# Patient Record
Sex: MALE | Race: WHITE
[De-identification: names, ages, dates, MRNs, and addresses within clinical notes are randomized per-mention and may not be internally consistent; named-entity substitution may affect disease eponyms.]

---

## 2018-07-17 NOTE — PAT MEDICATION INSTRUCTIONS
Service Date


Jul 17, 2018.





Current Home Medication List


Acetaminophen Tab (Tylenol), 650 MG PO PRN


Alfuzosin Hcl (Uroxatral), 10 MG PO QAM


Ascorbic Acid (Vitamin C), 1,000 MG PO QPM


Diltiazem Hcl Ext Rel (Tiazac), 240 MG PO QAM


Fish Oil (Omega-3), 2,000 MG PO BID


Furosemide (Lasix), 20 MG PO QAM


Glucosamine-Chondroitin-Vit C- (Glucosamine Chondroitin), 1 TAB PO QAM


Metoprolol Tartrate (Lopressor) (Lopressor), 50 MG PO BID


Misc Natural Products (Turmeric Curcumin)


Pantoprazole (Protonix), 40 MG PO BID


Rivaroxaban (Xarelto), 20 MG PO QPM


Turmeric (Curcuma Longa) (Turmeric Curcumin), 1,000 MG PO QAM


[Janumet/Metformin], 1 TAB PO QAM





Medication Instructions


For Your Scheduled Surgery 








- Hold the following medications starting 7/18/18:


Fish Oil (Omega-3), 2,000 MG PO BID


Glucosamine-Chondroitin-Vit C- (Glucosamine Chondroitin), 1 TAB PO QAM


Misc Natural Products (Turmeric Curcumin)


Turmeric (Curcuma Longa) (Turmeric Curcumin), 1,000 MG PO QAM








- Check with surgeon and cardiologist for instructions: 


Rivaroxaban (Xarelto), 20 MG PO QPM








- Hold the following medications the morning of surgery:


Furosemide (Lasix), 20 MG PO QAM


[Janumet/Metformin], 1 TAB PO QAM








- Take the following medications the morning of surgery with a sip of water:


Acetaminophen Tab (Tylenol), 650 MG PO PRN (okay to take up to 4 hours prior to 

surgery if needed)


Alfuzosin Hcl (Uroxatral), 10 MG PO QAM


Diltiazem Hcl Ext Rel (Tiazac), 240 MG PO QAM


Metoprolol Tartrate (Lopressor) (Lopressor), 50 MG PO BID


Pantoprazole (Protonix), 40 MG PO BID








- Take the following medications as scheduled the night before surgery:


Acetaminophen Tab (Tylenol), 650 MG PO PRN (if needed)


Ascorbic Acid (Vitamin C), 1,000 MG PO QPM


Metoprolol Tartrate (Lopressor) (Lopressor), 50 MG PO BID


Pantoprazole (Protonix), 40 MG PO BID








If you have any questions please call us at [phone] or [phone] or 

[phone]

## 2018-07-30 ENCOUNTER — HOSPITAL ENCOUNTER (INPATIENT)
Dept: HOSPITAL 45 - C.ACU | Age: 68
LOS: 3 days | Discharge: HOME | DRG: 455 | End: 2018-08-02
Attending: ORTHOPAEDIC SURGERY | Admitting: ORTHOPAEDIC SURGERY
Payer: COMMERCIAL

## 2018-07-30 VITALS
OXYGEN SATURATION: 95 % | HEART RATE: 83 BPM | SYSTOLIC BLOOD PRESSURE: 105 MMHG | DIASTOLIC BLOOD PRESSURE: 65 MMHG | TEMPERATURE: 97.7 F

## 2018-07-30 VITALS
TEMPERATURE: 97.7 F | HEART RATE: 69 BPM | SYSTOLIC BLOOD PRESSURE: 136 MMHG | DIASTOLIC BLOOD PRESSURE: 77 MMHG | OXYGEN SATURATION: 95 %

## 2018-07-30 VITALS
HEART RATE: 80 BPM | TEMPERATURE: 97.52 F | OXYGEN SATURATION: 93 % | DIASTOLIC BLOOD PRESSURE: 63 MMHG | SYSTOLIC BLOOD PRESSURE: 101 MMHG

## 2018-07-30 VITALS
BODY MASS INDEX: 31.56 KG/M2 | HEIGHT: 70 IN | HEIGHT: 70 IN | WEIGHT: 220.46 LBS | WEIGHT: 220.46 LBS | BODY MASS INDEX: 31.56 KG/M2 | BODY MASS INDEX: 31.56 KG/M2

## 2018-07-30 VITALS
DIASTOLIC BLOOD PRESSURE: 65 MMHG | OXYGEN SATURATION: 95 % | TEMPERATURE: 97.7 F | SYSTOLIC BLOOD PRESSURE: 103 MMHG | HEART RATE: 84 BPM

## 2018-07-30 VITALS — DIASTOLIC BLOOD PRESSURE: 62 MMHG | HEART RATE: 92 BPM | SYSTOLIC BLOOD PRESSURE: 96 MMHG

## 2018-07-30 VITALS
SYSTOLIC BLOOD PRESSURE: 142 MMHG | HEART RATE: 77 BPM | OXYGEN SATURATION: 96 % | DIASTOLIC BLOOD PRESSURE: 96 MMHG | TEMPERATURE: 98.6 F

## 2018-07-30 VITALS
HEART RATE: 82 BPM | DIASTOLIC BLOOD PRESSURE: 78 MMHG | SYSTOLIC BLOOD PRESSURE: 110 MMHG | TEMPERATURE: 97.88 F | OXYGEN SATURATION: 95 %

## 2018-07-30 VITALS
DIASTOLIC BLOOD PRESSURE: 68 MMHG | OXYGEN SATURATION: 92 % | TEMPERATURE: 97.7 F | HEART RATE: 75 BPM | SYSTOLIC BLOOD PRESSURE: 108 MMHG

## 2018-07-30 DIAGNOSIS — I48.91: ICD-10-CM

## 2018-07-30 DIAGNOSIS — M48.062: Primary | ICD-10-CM

## 2018-07-30 DIAGNOSIS — Z88.7: ICD-10-CM

## 2018-07-30 DIAGNOSIS — Z87.891: ICD-10-CM

## 2018-07-30 DIAGNOSIS — E11.9: ICD-10-CM

## 2018-07-30 DIAGNOSIS — R33.8: ICD-10-CM

## 2018-07-30 DIAGNOSIS — N40.0: ICD-10-CM

## 2018-07-30 DIAGNOSIS — Z88.8: ICD-10-CM

## 2018-07-30 DIAGNOSIS — Z79.899: ICD-10-CM

## 2018-07-30 DIAGNOSIS — Z79.84: ICD-10-CM

## 2018-07-30 DIAGNOSIS — N18.3: ICD-10-CM

## 2018-07-30 DIAGNOSIS — I13.10: ICD-10-CM

## 2018-07-30 DIAGNOSIS — Z79.01: ICD-10-CM

## 2018-07-30 LAB
HCT VFR BLD CALC: 33.9 % (ref 42–52)
HGB BLD-MCNC: 11.6 G/DL (ref 14–18)

## 2018-07-30 PROCEDURE — 0SG00AJ FUSION OF LUMBAR VERTEBRAL JOINT WITH INTERBODY FUSION DEVICE, POSTERIOR APPROACH, ANTERIOR COLUMN, OPEN APPROACH: ICD-10-PCS | Performed by: ORTHOPAEDIC SURGERY

## 2018-07-30 PROCEDURE — 0SG3071 FUSION OF LUMBOSACRAL JOINT WITH AUTOLOGOUS TISSUE SUBSTITUTE, POSTERIOR APPROACH, POSTERIOR COLUMN, OPEN APPROACH: ICD-10-PCS | Performed by: ORTHOPAEDIC SURGERY

## 2018-07-30 PROCEDURE — 0ST20ZZ RESECTION OF LUMBAR VERTEBRAL DISC, OPEN APPROACH: ICD-10-PCS | Performed by: ORTHOPAEDIC SURGERY

## 2018-07-30 PROCEDURE — 0SG1071 FUSION OF 2 OR MORE LUMBAR VERTEBRAL JOINTS WITH AUTOLOGOUS TISSUE SUBSTITUTE, POSTERIOR APPROACH, POSTERIOR COLUMN, OPEN APPROACH: ICD-10-PCS | Performed by: ORTHOPAEDIC SURGERY

## 2018-07-30 RX ADMIN — CEFAZOLIN SCH MLS/MIN: 10 INJECTION, POWDER, FOR SOLUTION INTRAVENOUS at 19:31

## 2018-07-30 RX ADMIN — SODIUM CHLORIDE SCH MLS/HR: 900 INJECTION, SOLUTION INTRAVENOUS at 23:53

## 2018-07-30 RX ADMIN — SODIUM CHLORIDE SCH MLS/HR: 900 INJECTION, SOLUTION INTRAVENOUS at 17:25

## 2018-07-30 RX ADMIN — FENTANYL CITRATE PRN MCG: 50 INJECTION, SOLUTION INTRAMUSCULAR; INTRAVENOUS at 16:21

## 2018-07-30 RX ADMIN — METOPROLOL TARTRATE SCH MG: 50 TABLET, FILM COATED ORAL at 21:00

## 2018-07-30 RX ADMIN — PANTOPRAZOLE SCH MG: 40 TABLET, DELAYED RELEASE ORAL at 21:03

## 2018-07-30 RX ADMIN — OXYCODONE HYDROCHLORIDE PRN MG: 5 TABLET ORAL at 22:08

## 2018-07-30 RX ADMIN — INSULIN ASPART SCH UNITS: 100 INJECTION, SOLUTION INTRAVENOUS; SUBCUTANEOUS at 21:06

## 2018-07-30 RX ADMIN — FENTANYL CITRATE PRN MCG: 50 INJECTION, SOLUTION INTRAMUSCULAR; INTRAVENOUS at 16:27

## 2018-07-30 RX ADMIN — STANDARDIZED SENNA CONCENTRATE AND DOCUSATE SODIUM SCH TAB: 8.6; 5 TABLET ORAL at 21:04

## 2018-07-30 RX ADMIN — OXYCODONE HYDROCHLORIDE PRN MG: 5 TABLET ORAL at 17:29

## 2018-07-30 NOTE — HISTORY AND PHYSICAL
History & Physical


Date


Jul 30, 2018.





Chief Complaint


Back and leg pain





History of Present Illness


The patient is a 68 year old male with complaints of back and leg pain





Additional History


Hepatic Disease:  No


Endocrine Disorder:  No


Kidney Disease:  No


Hypertension:  Yes


Heart Disease:  Yes


Bleeding Tendencies:  No


Infectious Diseases:  No


Other:


Atrial fibrillation


Diabetes





Allergies


Coded Allergies:  


     Dextromethorphan (Verified  Allergy, Unknown, RASH, 7/30/18)


     Doxylamine (Verified  Allergy, Unknown, RASH, 7/30/18)


     Ethanol (Verified  Allergy, Unknown, RASH, 7/30/18)


     Pseudoephedrine (Verified  Allergy, Unknown, RASH, 7/30/18)


     Tetanus Toxoid (Verified  Allergy, Unknown, RASH SWELLING, 7/30/18)





Home Medications


Scheduled


Acetaminophen Tab (Tylenol), 650 MG PO PRN


Alfuzosin Hcl (Uroxatral), 10 MG PO QAM


Ascorbic Acid (Vitamin C), 1,000 MG PO QPM


Diltiazem Hcl Ext Rel (Tiazac), 240 MG PO QAM


Fish Oil (Omega-3), 2,000 MG PO BID


Furosemide (Lasix), 20 MG PO QAM


Glucosamine-Chondroitin-Vit C- (Glucosamine Chondroitin), 1 TAB PO QAM


Metoprolol Tartrate (Lopressor) (Lopressor), 50 MG PO BID


Pantoprazole (Protonix), 40 MG PO BID


Rivaroxaban (Xarelto), 20 MG PO QPM


Turmeric (Curcuma Longa) (Turmeric Curcumin), 1,000 MG PO QAM


Valsartan/Hctz (Diovan Hct 160MG/25MG), 1 TAB PO DAILY


[Janumet/Metformin], 1 TAB PO QAM





Miscellaneous Medications


Misc Natural Products (Turmeric Curcumin)





Physical Examination


Skin:  warm/dry, no rash


Eyes:  normal inspection, EOMI, sclerae normal


ENT:  normal ENT inspection, pharynx normal


Head:  normocephalic, atraumatic


Neck:  supple, no adenopathy, trachea midline


Respiratory/Chest:  lungs clear, normal breath sounds, no respiratory distress


Cardiovascular:  regular rate, rhythm, no edema, no murmur


Abdomen / GI:  normal bowel sounds, non tender


Back:  normal inspection


Extremities:  normal inspection, normal range of motion


Neurologic/Psych:  no motor/sensory deficits, alert, normal reflexes, oriented 

x 3





Diagnosis


Lumbar spinal stenosis with neurogenic claudication





Plan of Treatment


L2-S1 decompression and fusion

## 2018-07-30 NOTE — MEDICAL CONSULT
Consultation


Date of Consultation:


Jul 30, 2018.


Attending Physician:


Nico Smith D.O.


Reason for Consultation:


medical management post spine surgery


History of Present Illness


here post spine surgery


very groggy


did have some pain meds earlier


no acute complaints - no pain at this time. 





wife present and does not voice any concerns either





med hx confirmed - most relevant being DM, afib, HTN





Past Medical/Surgical History


DM2 (last A1c 7 point something)


HTN


afib (new onset last year, only hospitalized for initial case, did have 

ablation last fall, still gets palpitations at times)





Family History


none of note





Social History


Smoking Status:  Former Smoker





Allergies


Coded Allergies:  


     Dextromethorphan (Verified  Allergy, Unknown, RASH, 7/30/18)


     Doxylamine (Verified  Allergy, Unknown, RASH, 7/30/18)


     Ethanol (Verified  Allergy, Unknown, RASH, 7/30/18)


     Pseudoephedrine (Verified  Allergy, Unknown, RASH, 7/30/18)


     Tetanus Toxoid (Verified  Allergy, Unknown, RASH SWELLING, 7/30/18)





Current Inpatient Medications





Current Inpatient Medications








 Medications


  (Trade)  Dose


 Ordered  Sig/Romelia


 Route  Start Time


 Stop Time Status Last Admin


Dose Admin


 


 Promethazine HCl


 12.5 mg/Sodium


 Chloride  50.5 ml @ 


 202 mls/hr  Q6H  PRN


 IV  7/30/18 15:45


 8/29/18 15:44   


 


 


 Ondansetron HCl


  (Zofran Inj)  4 mg  Q6H  PRN


 IV  7/30/18 15:45


 8/29/18 15:44   


 


 


 Metoclopramide HCl


  (Reglan Inj)  10 mg  Q6H  PRN


 IV  7/30/18 15:45


 8/29/18 15:44   


 


 


 Lorazepam


  (Ativan Tab)  0.5 mg  Q8H  PRN


 PO  7/30/18 15:45


 8/29/18 15:44   


 


 


 Lorazepam 0.5 mg/


 Syringe  1 ml @ 1


 mls/min  Q8H  PRN


 IV  7/30/18 15:45


 8/29/18 15:44   


 


 


 Pneumococcal


 Polysaccharide


 Vaccine  1 ea  PRN  PRN


 N/A  7/30/18 15:45


 8/29/18 15:44   


 


 


 Influenza Virus


 Vacc Triv Types


 A&B  1 ea  PRN  PRN


 N/A  7/30/18 15:45


 8/29/18 15:44   


 


 


 Polyethylene


  (Miralax Powder


 Packet)  17 gm  Q6


 PO  8/1/18 06:00


 8/31/18 05:59   


 


 


 Bisacodyl


  (Dulcolax Supp)  10 mg  DAILY  PRN


 IN  7/30/18 15:45


 8/29/18 15:44   


 


 


 Magnesium


 Hydroxide


  (Milk Of


 Magnesia Susp)  30 ml  DAILY  PRN


 PO  7/30/18 15:45


 8/29/18 15:44   


 


 


 Hydromorphone HCl


  (Dilaudid Inj)  0.5-1mg


 prn


 moder...  Q3H  PRN


 IV  7/30/18 17:00


 8/13/18 16:59   


 


 


 Oxycodone HCl


  (Roxicodone


 Immediate Rel Tab)  5-10mg prn


 moderate


 to sev...  Q4H  PRN


 PO  7/30/18 17:00


 8/13/18 16:59  7/30/18 17:29


5 MG


 


 Cefazolin Sodium


 2000 mg/Syringe  15 ml @ 


 3.75 mls/


 min  Q8H


 IV  7/30/18 20:00


 7/31/18 04:03   


 


 


 Sodium Chloride  1,000 ml @ 


 150 mls/hr  Q6H40M


 IV  7/30/18 15:42


 8/29/18 15:41  7/30/18 17:25


150 MLS/HR


 


 Acetaminophen


  (Tylenol Tab)  1,000 mg  Q8H  PRN


 PO  7/30/18 15:45


 8/29/18 15:44   


 


 


 Acetaminophen  100 ml @ 


 400 mls/hr  Q8H  PRN


 IV  7/30/18 15:45


 8/29/18 15:44   


 


 


 Naloxone HCl


  (Narcan Inj)  0.1 mg  Q5M  PRN


 IV  7/30/18 15:45


 8/29/18 15:44   


 


 


 Senna/Docusate


 Sodium


  (Senokot S Tab)  2 tab  HS


 PO  7/30/18 21:00


 8/29/18 20:59   


 


 


 Sodium


 Biphosphate/


 Sodium Phosphate


  (Fleet Enema)  132 ml  ONE  PRN


 IN  7/30/18 15:45


 8/29/18 15:44   


 


 


 Hydroxyzine HCl


  (Vistaril Tab)  25 mg  Q8H  PRN


 PO  7/30/18 15:45


 8/29/18 15:44   


 


 


 Al Hydroxide/Mg


 Hydroxide


  (Maalox Susp)  30 ml  Q6H  PRN


 PO  7/30/18 15:45


 8/29/18 15:44   


 


 


 Famotidine


  (Pepcid Tab)  20 mg  Q12  PRN


 PO  7/30/18 15:45


 8/29/18 15:44   


 


 


 Diphenhydramine


 HCl


  (Benadryl Cap)  25 mg  Q6H  PRN


 PO  7/30/18 15:45


 8/29/18 15:44   


 


 


 Alfuzosin HCl


  (Uroxatral Tab)  10 mg  QAM


 PO  7/31/18 09:00


 8/30/18 08:59   


 


 


 Diltiazem HCl


  (TIAzac CAP)  240 mg  QAM


 PO  7/31/18 09:00


 8/30/18 08:59   


 


 


 Furosemide


  (Lasix Tab)  20 mg  QAM


 PO  7/31/18 09:00


 8/30/18 08:59   


 


 


 Metoprolol


 Tartrate


  (Lopressor Tab)  50 mg  BID


 PO  7/30/18 21:00


 8/29/18 20:59   


 


 


 Pantoprazole


 Sodium


  (Protonix Tab)  40 mg  BID


 PO  7/30/18 21:00


 8/29/18 20:59   


 


 


 Valsartan


  (Diovan Tab)  160 mg  DAILY


 PO  7/31/18 09:00


 8/30/18 08:59   


 


 


 Hydrochlorothiazide


  (Hydrochlorothiazide


 Tab)  25 mg  DAILY


 PO  7/31/18 09:00


 8/30/18 08:59   


 











Review of Systems


ROS otherwise unobtainable except for as above, does not voice complaints, but 

is quite groggy post op and post pain meds





Physical Exam











  Date Time  Temp Pulse Resp B/P (MAP) Pulse Ox O2 Delivery O2 Flow Rate FiO2


 


7/30/18 18:04 36.4 80 18 101/63 (76) 93 Nasal Cannula 2.0 


 


7/30/18 17:32 36.5 75 16 108/68 (81) 92 Nasal Cannula 2.0 


 


7/30/18 17:09      Nasal Cannula 2.0 


 


7/30/18 17:00 36.6 82 16 110/78 (89) 95 Nasal Cannula 2.0 


 


7/30/18 16:45 36.5 84 19 115/66 94 Nasal Cannula 4 


 


7/30/18 16:35  84 25 114/66 93 Nasal Cannula 4 


 


7/30/18 16:25  89 27 113/76 95 Oxymask 10 


 


7/30/18 16:15  89 27 113/76 95 Oxymask 10 


 


7/30/18 16:07 36.2 89 17 117/70 96 Oxymask 10 


 


7/30/18 10:43 37 77 20 142/96 96 Room Air  








General Appearance:  WD/WN, no apparent distress (fatigued)


Head:  atraumatic


Eyes:  EOMI


ENT:  hearing grossly normal


Neck:  trachea midline


Respiratory/Chest:  lungs clear, normal breath sounds, no respiratory distress, 

no accessory muscle use


Cardiovascular:  regular rate, rhythm, no edema, no gallop, no murmur


Extremities/Musculoskelatal:  + pertinent finding (no gross lesions or 

deformities)


Neurologic/Psych:  CNs II-XII nml as tested, alert, normal mood/affect


Skin:  normal color, warm/dry





Laboratory Results





Last 24 Hours








Test


  7/30/18


10:23 7/30/18


10:33 7/30/18


16:11 7/30/18


16:18


 


Bedside Glucose 136 mg/dl   215 mg/dl  


 


Hemoglobin    11.6 g/dL 


 


Hematocrit    33.9 % 











Assessment & Plan


atrial fibrillation


-rates controlled - continue diltiazem


-resume anticoagulation once safe with ortho/spine, ok to hold for now








essential hypertension


-continue diltiazem, follow BP


-until clear hemodynamics are stable will hold diuretics and ARB


-since valsartan implicated as carcinogen by chinese manufacturers, may want to 

consider changing ARB altogether


-?utility of both HCTZ and lasix - doesn't relate a CHF hx, possibly when 

resuming just resume HCTZ and follow








type 2 diabetes


-hold janumet, use fingersticks and supplemental insulin - transition to basal/

bolus if needed


-check A1c


-follow








elevated creatinine


-?baseline stage 2 CKD vs dry from being on both thiazide and loop - see above.

  follow BMP





DVT proph


-per orthospine; as above would resume anticoagulation once safe

## 2018-07-30 NOTE — DIAGNOSTIC IMAGING REPORT
LUMBAR SPINE, INTRAOPERATIVE FLUOROSCOPY



HISTORY: L2-S1 decompression and fusion.



FLUOROSCOPY TIME: 35 seconds.



FINDINGS: Intraoperative fluoroscopy was provided for the lumbar spine. 4

fluoroscopic spot images were obtained. Posterior decompression fusion from L2

through S1 with pedicle screws and rods. The hardware is intact.

IMPRESSION: Fluoroscopy provided for a L2-S1 posterior decompression and fusion.









Electronically signed by:  Abdullahi Manuel M.D.

7/30/2018 4:02 PM



Dictated Date/Time:  7/30/2018 4:01 PM

## 2018-07-30 NOTE — [facility] OPERATIVE REPORT
Operative Report


Operative Date


Jul 30, 2018.





Pre-Operative Diagnosis





Lumbar spinal stenosis with neurogenic claudication





Post-Operative Diagnosis





Lumbar spinal stenosis with neurogenic claudication





Procedure(s) Performed





1.  Lumbar decompression medial facetectomy foraminotomy L2-3 L3-4 L4-5


L5-S1.  #2 posterior spinal fusion L2-3 L3-4 L4-5 L5-S1.  #3 placement


posterior segmental instrumentation L2-3 L3-4 L4-5 L5-S1.  #4 interbody


fusion L4-5.  #5 placement of 9 x 26 mm titanium cage L4-5 per #6


placement of local autograft in the posterior lateral gutters.  #7


placement InFUSE collagen sponge, with master graft in the posterior


gutters and ostial amp in the interbody space.





Surgeon


Dr. Smith





Assistant Surgeon(s)


Brionna Alvarado PA-C





Estimated Blood Loss


800





Findings


Severe multilevel spinal stenosis





Specimens





none per surgeon





Description of Procedure


Patient was met with preoperatively case discussed all questions addressed.  

After informed consent obtained patient was taken to the operative suite 

underwent intubation placed prone position on the Willi table on top of the 

Job frame.  All bony prominences well-padded eyes inspected to ensure no 

external pressure placed upon the.  This point the lumbar spine was prepped and 

draped in a normal sterile fashion.  Sharp dissection with the assistance Bovie 

cautery was performed down to and exposing the lamina and transverse processes 

of L2 L3-L4-L5 and sacral ala bilaterally.  From a caudal to cephalad fashion 

complete laminectomy of L5 L4 L3 and L2 is performed addressing severe lateral 

recess and foraminal disease.  Pedicle screws were then placed in L2 L3-L4-L5 

and S1 levels bilaterally with the assistance of fluoroscopy and the purposes 

ekta provisionally placed.  Through a trans-foraminal approach on the left 

complete discectomy of L4-5 was performed endplates created to subcortical 

bleeding bone and a 9 x 26 mm titanium cage filled with ostium bone graft 

tapped in position.  Probe size rods were then locked in position bilaterally.  

The transverse processes of L2 L3-L4-L5 and sacral ala burred to subcortical 

bleeding bone.  Infuse collagen sponge mass graft local autograft placed in the 

posterior gutters.  Approximately 150 cc of Exparel injected into the 

musculature.  15 round JULY drain inserted.  Incision was then closed with 1 

Vicryl fascia 2-0 Vicryl subtends a 4 Monocryl for final skin closure.  Sterile 

dressings placed.  Patient will continue to PACU stable disc.  Please note Brionna Morton was present throughout the entire procedure involved in patient 

positioning complex portions of the surgery and final skin closure.


I attest to the content of the Intraoperative Record and any orders documented 

therein.  Any exceptions are noted below.

## 2018-07-30 NOTE — ANESTHESIOLOGY PROGRESS NOTE
Anesthesia Post Op Note


Date & Time


Jul 30, 2018 at 16:45





Vital Signs


Pain Intensity:  3





Vital Signs Past 12 Hours








  Date Time  Temp Pulse Resp B/P (MAP) Pulse Ox O2 Delivery O2 Flow Rate FiO2


 


7/30/18 16:35  84 25 114/66 93 Nasal Cannula 4 


 


7/30/18 16:25  89 27 113/76 95 Oxymask 10 


 


7/30/18 16:15  89 27 113/76 95 Oxymask 10 


 


7/30/18 16:07 36.2 89 17 117/70 96 Oxymask 10 


 


7/30/18 10:43 37 77 20 142/96 96 Room Air  











Notes


Mental Status:  alert / awake / arousable, participated in evaluation


Pt Amnestic to Procedure:  Yes


Nausea / Vomiting:  adequately controlled


Pain:  adequately controlled


Airway Patency, RR, SpO2:  stable & adequate


BP & HR:  stable & adequate


Hydration State:  stable & adequate


Anesthetic Complications:  no major complications apparent

## 2018-07-31 VITALS
SYSTOLIC BLOOD PRESSURE: 114 MMHG | HEART RATE: 98 BPM | OXYGEN SATURATION: 95 % | DIASTOLIC BLOOD PRESSURE: 72 MMHG | TEMPERATURE: 97.88 F

## 2018-07-31 VITALS
OXYGEN SATURATION: 94 % | DIASTOLIC BLOOD PRESSURE: 72 MMHG | TEMPERATURE: 97.88 F | SYSTOLIC BLOOD PRESSURE: 118 MMHG | HEART RATE: 100 BPM

## 2018-07-31 VITALS
DIASTOLIC BLOOD PRESSURE: 75 MMHG | TEMPERATURE: 98.06 F | HEART RATE: 88 BPM | OXYGEN SATURATION: 95 % | SYSTOLIC BLOOD PRESSURE: 116 MMHG

## 2018-07-31 VITALS
TEMPERATURE: 97.88 F | DIASTOLIC BLOOD PRESSURE: 75 MMHG | SYSTOLIC BLOOD PRESSURE: 123 MMHG | OXYGEN SATURATION: 93 % | HEART RATE: 84 BPM

## 2018-07-31 VITALS — HEART RATE: 60 BPM | DIASTOLIC BLOOD PRESSURE: 77 MMHG | SYSTOLIC BLOOD PRESSURE: 118 MMHG

## 2018-07-31 VITALS
TEMPERATURE: 97.52 F | DIASTOLIC BLOOD PRESSURE: 61 MMHG | HEART RATE: 90 BPM | SYSTOLIC BLOOD PRESSURE: 98 MMHG | OXYGEN SATURATION: 95 %

## 2018-07-31 VITALS — SYSTOLIC BLOOD PRESSURE: 112 MMHG | HEART RATE: 111 BPM | OXYGEN SATURATION: 95 % | DIASTOLIC BLOOD PRESSURE: 70 MMHG

## 2018-07-31 VITALS — OXYGEN SATURATION: 91 %

## 2018-07-31 LAB
BASOPHILS # BLD: 0 K/UL (ref 0–0.2)
BASOPHILS NFR BLD: 0 %
BUN SERPL-MCNC: 20 MG/DL (ref 7–18)
CALCIUM SERPL-MCNC: 7.3 MG/DL (ref 8.5–10.1)
CO2 SERPL-SCNC: 22 MMOL/L (ref 21–32)
CREAT SERPL-MCNC: 1.32 MG/DL (ref 0.6–1.4)
EOS ABS #: 0 K/UL (ref 0–0.5)
EOSINOPHIL NFR BLD AUTO: 144 K/UL (ref 130–400)
GLUCOSE SERPL-MCNC: 178 MG/DL (ref 70–99)
HBA1C MFR BLD: 6.8 % (ref 4.5–5.6)
HCT VFR BLD CALC: 28.8 % (ref 42–52)
HGB BLD-MCNC: 9.8 G/DL (ref 14–18)
IG#: 0.06 K/UL (ref 0–0.02)
IMM GRANULOCYTES NFR BLD AUTO: 6.1 %
LYMPHOCYTES # BLD: 0.64 K/UL (ref 1.2–3.4)
MCH RBC QN AUTO: 32.8 PG (ref 25–34)
MCHC RBC AUTO-ENTMCNC: 34 G/DL (ref 32–36)
MCV RBC AUTO: 96.3 FL (ref 80–100)
MONO ABS #: 0.47 K/UL (ref 0.11–0.59)
MONOCYTES NFR BLD: 4.4 %
NEUT ABS #: 9.4 K/UL (ref 1.4–6.5)
NEUTROPHILS # BLD AUTO: 0 %
NEUTROPHILS NFR BLD AUTO: 88.9 %
PMV BLD AUTO: 10.2 FL (ref 7.4–10.4)
POTASSIUM SERPL-SCNC: 4.2 MMOL/L (ref 3.5–5.1)
RED CELL DISTRIBUTION WIDTH CV: 14 % (ref 11.5–14.5)
RED CELL DISTRIBUTION WIDTH SD: 49.3 FL (ref 36.4–46.3)
SODIUM SERPL-SCNC: 135 MMOL/L (ref 136–145)
WBC # BLD AUTO: 10.57 K/UL (ref 4.8–10.8)

## 2018-07-31 RX ADMIN — METOPROLOL TARTRATE SCH MG: 50 TABLET, FILM COATED ORAL at 08:43

## 2018-07-31 RX ADMIN — INSULIN ASPART SCH UNITS: 100 INJECTION, SOLUTION INTRAVENOUS; SUBCUTANEOUS at 08:46

## 2018-07-31 RX ADMIN — DILTIAZEM HYDROCHLORIDE SCH MG: 120 CAPSULE, EXTENDED RELEASE ORAL at 08:43

## 2018-07-31 RX ADMIN — INSULIN ASPART SCH UNITS: 100 INJECTION, SOLUTION INTRAVENOUS; SUBCUTANEOUS at 12:21

## 2018-07-31 RX ADMIN — PANTOPRAZOLE SCH MG: 40 TABLET, DELAYED RELEASE ORAL at 21:38

## 2018-07-31 RX ADMIN — PANTOPRAZOLE SCH MG: 40 TABLET, DELAYED RELEASE ORAL at 08:42

## 2018-07-31 RX ADMIN — ACETAMINOPHEN PRN MG: 500 TABLET, FILM COATED ORAL at 00:08

## 2018-07-31 RX ADMIN — OXYCODONE HYDROCHLORIDE PRN MG: 5 TABLET ORAL at 13:58

## 2018-07-31 RX ADMIN — OXYCODONE HYDROCHLORIDE PRN MG: 5 TABLET ORAL at 03:19

## 2018-07-31 RX ADMIN — INSULIN ASPART SCH UNITS: 100 INJECTION, SOLUTION INTRAVENOUS; SUBCUTANEOUS at 18:20

## 2018-07-31 RX ADMIN — INSULIN ASPART SCH UNITS: 100 INJECTION, SOLUTION INTRAVENOUS; SUBCUTANEOUS at 20:53

## 2018-07-31 RX ADMIN — OXYCODONE HYDROCHLORIDE PRN MG: 5 TABLET ORAL at 20:12

## 2018-07-31 RX ADMIN — CEFAZOLIN SCH MLS/MIN: 10 INJECTION, POWDER, FOR SOLUTION INTRAVENOUS at 03:24

## 2018-07-31 RX ADMIN — METOPROLOL TARTRATE SCH MG: 50 TABLET, FILM COATED ORAL at 20:53

## 2018-07-31 RX ADMIN — OXYCODONE HYDROCHLORIDE PRN MG: 5 TABLET ORAL at 07:43

## 2018-07-31 RX ADMIN — SODIUM CHLORIDE SCH MLS/HR: 900 INJECTION, SOLUTION INTRAVENOUS at 06:02

## 2018-07-31 RX ADMIN — ALFUZOSIN HYDROCHLORIDE SCH MG: 10 TABLET, EXTENDED RELEASE ORAL at 08:42

## 2018-07-31 RX ADMIN — STANDARDIZED SENNA CONCENTRATE AND DOCUSATE SODIUM SCH TAB: 8.6; 5 TABLET ORAL at 20:54

## 2018-07-31 NOTE — PROGRESS NOTE
Progress Note


Date of Service


Jul 31, 2018.





Progress Note


Patient's back pain is controlled.  He is struggling some right knee pain this 

appears to be a chronic issue.  Vital signs are stable.  He has good strength 

testing lower extremities.  Assessment status post multilevel lumbar depression 

fusion.  Plan at this time will continue therapy this afternoon if he is able 

to tolerate it.  We will initiate a bowel regimen starting today.

## 2018-07-31 NOTE — DISCHARGE INSTRUCTIONS
Discharge Instructions


Date of Service


Jul 31, 2018.





Admission


Reason for Admission:  Lumbar Spinal Stenosis





Discharge


Discharge Diagnosis / Problem:  lumbar stenosis





Discharge Goals


Goal(s):  Improve function





Activity Recommendations


Activity Limitations:  per Instructions/Follow-up section





.





Instructions / Follow-Up


Instructions / Follow-Up





ACTIVITY RECOMMENDATIONS:





SELF CARE INSTRUCTIONS AFTER THORACIC/LUMBAR FUSIONS





1.  You may walk to your tolerance.  It is good exercise for your legs and back.


      Expect some back and intermittent leg aches and pains.





2.  You may perform "counter-top" level activities (make a sandwich, marimar 

with a


     project, etc.).





3.  No bending or lifting of more than 10 pounds or back twisting of any nature 

(roll


      like a log when turning in bed).





4.  You may ride in a car for 20-30 minutes at a time.  No driving until after 

your


      first visit with your doctor.





5.  Frequent changes of position and restricting sitting to 30 minutes at a 

time will


      help limit the amount of back spasms and stiffness you may experience.





6.  You may discontinue the use of ambulatory aids (cane, crutches, etc.) once 

your


      strength and confidence allow.





7.  You may  the shower and let water strike your incision when you 

arrive 


     home at least once daily.  Do not take a tub bath, sit in a hot tub or go 

into a


     swimming pool until after your first recheck in the office.








SPECIAL CARE INSTRUCTIONS:





**VERY IMPORTANT TO READ AND REVIEW**





A.  Your surgical incision has been closed with a cosmetic suture under the 


     skin that will dissolve in about 6 weeks.  In 14 days, you can use a pair 


     of clean scissors and cut the suture that is left outside of the skin at 

the 


     ends of your incision.


   1.  The small skin tapes can be removed 7 days after surgery if they 


               have not fallen off by that point.


   2.  You may keep the wound open to air as much as possible to promote


              healing after post-op day number 5 unless told otherwise by your 

doctor.


   3.  If you think the wound looks like it is becoming infected (redness or 


              worsening drainage) and/or you are experiencing fever, chill or 

worsening


              back pain and muscle spasms, contact the office so that we may 

evaluate


              you as soon as possible.





B.  Complications are uncommon, but please contact us if you have any signs or 


     symptoms of:


   1.  wound infection (fever higher than 102.5 degrees F, redness, separation


              of wound, drainage, or increasing pain from the incision) 


   2.  blood clots in legs (pain, swelling, redness and warmth in legs)


   3.  urinary tract infection (fever higher than 102.5 degrees F, burning upon


              urination or increased frequency of urination)


   4.  nerve problems (inability to walk on your toes or heels, numbness, loss 


              of bowel or bladder control)


   5.  any other symptoms that concern you





C.  Please call the office at [phone] if you have any concerns or 

questions


     about your operation or recovery.





D.  No smoking!  Smoking drastically decreases the chance of a solid fusion.





E.  Do not take any anti-inflammatory medications (Indocin, Advil, Motrin, 

Aspirin, 


     Naprosyn, etc.) as these may inhibit the chance of a solid fusion.  

Tylenol is


     okay to take for pain.








MANAGING PAIN AFTER SPINAL SURGERY





1.  Narcotic medication is intended for short-term use and will be provided for 


     surgical pain.  Surgical pain usually lasts for a period of 4-6 weeks.  

Narcotic 


     medication includes Percocet, Vicodin, Darvocet, Tylenol #3 or Lortab.





2.  Longer-term pain is more appropriately treated with non-narcotic medication 


    such as Tylenol ES.





3.  Muscle spasm is not appropriately treated with narcotics.  Muscle relaxers 

such


    as Soma, Flexeril or Skelaxin can be used along with Tylenol ES.





4.  Remember that we all live with some "aches and pains".  This is not unusual 

or 


     uncommon after an injury or as we get older.


   a.  Back pain is expected and may include muscle spasms for 4 to 6 weeks 


              after surgery.  The pain should gradually improve.  If the pain 

worsens for 


              no apparent reason, please contact the office.


   b.  Intermittent leg pain may also be experienced and should not be concerned


        about unless it worsens for no apparent reason.  If so, please contact 

the


               office.





5.  We will provide appropriate medication within the normal guidelines of 

their prescribed


     use.  We will also be very cautious and aware of potential abuse and 

extended


     duration of patients' medication needs.


   a.  Pain medications are for your comfort and to assist with sleep and


        rest so that the tissue can heal.  They are not provided in order to 

return 


              to normal activity and should not be used through the day.  To do 

so or 


              worsening pain at night can result from ongoing tissue damage and 

development


              of tolerance to the prescribed medicine.





6.  Please allow 2-3 days to process refills.  Prescriptions will not be mailed 

but must be


     picked up at the office.








FOLLOW UP VISIT:





Keep your scheduled follow-up appointment. 





Any questions, please call the office at [phone].





Current Hospital Diet


Patient's current hospital diet: Diabetes Type 2 Diet





Discharge Diet


Recommended Diet:  Regular Diet





Procedures


Procedures Performed:  


1.  Lumbar decompression medial facetectomy foraminotomy L2-3 L3-4 L4-5


L5-S1.  #2 posterior spinal fusion L2-3 L3-4 L4-5 L5-S1.  #3 placement


posterior segmental instrumentation L2-3 L3-4 L4-5 L5-S1.  #4 interbody


fusion L4-5.  #5 placement of 9 x 26 mm titanium cage L4-5 per #6


placement of local autograft in the posterior lateral gutters.  #7


placement InFUSE collagen sponge, with master graft in the posterior


gutters and ostial amp in the interbody space.





Pending Studies


Studies pending at discharge:  no





Laboratory Results





Hemoglobin A1c








Test


  7/30/18


16:18 Range/Units


 


 


Estimated Average Glucose 148   mg/dl


 


Hemoglobin A1c 6.8 H 4.5-5.6  %











Medical Emergencies








.


Who to Call and When:





Medical Emergencies:  If at any time you feel your situation is an emergency, 

please call 911 immediately.





.





Non-Emergent Contact


Non-Emergency issues call your:  Primary Care Provider


.








"Provider Documentation" section prepared by Nico Smith.








.

## 2018-07-31 NOTE — HOSPITALIST PROGRESS NOTE
Hospitalist Progress Note


Date of Service


Jul 31, 2018.





Subjective


Pt evaluation today including:  conversation w/ patient, physical exam, chart 

review, lab review, review of inpatient medication list


Pain:  Right knee, chronic


PO Intake:  Tolerating PO diet


Patient currently reports feeling well.  Earlier this morning he did develop 

dizziness and lightheadedness while working with physical therapy, as well as 

palpitations.  Patient does have history of a-fib.  He states these symptoms 

resolved after resting back in bed, and he currently feels back to normal.  He 

denies any chest pain or shortness of breath.  He does complain of some numbness

/tingling in his right knee, as well as pain, but these are chronic issues that 

were occurring prior to surgery.  He reports some mild muscle soreness in his 

back.  He is tolerating a PO diet well.  His Ackerman was just removed this 

morning so he has not yet voided on his own.  He has not yet passed gas or had 

a bowel movement postop.  The patient denies fevers, chills, sweats, chest pain

, claudication, cough, wheezing, shortness of breath, nausea, vomiting, 

abdominal pain, dysuria, hematuria, urinary retention, paralysis, weakness, 

acute numbness and tingling.





   Additional Comments:


See HPI for pertinent positives and negatives.  All other systems reviewed and 

negative.





Objective


Vital Signs











  Date Time  Temp Pulse Resp B/P (MAP) Pulse Ox O2 Delivery O2 Flow Rate FiO2


 


7/31/18 07:24      Room Air  


 


7/31/18 07:09 36.6 100 17 118/72 (87) 94 Room Air  


 


7/31/18 06:07     91 Room Air  


 


7/31/18 03:00 36.4 90 18 98/61 (73) 95 Room Air  


 


7/31/18 00:05      Room Air  


 


7/30/18 22:50 36.5 84 16 103/65 (78) 95 Nasal Cannula 2.0 


 


7/30/18 21:02  92  96/62 (73)    


 


7/30/18 20:05 36.5 83 16 105/65 (78) 95 Nasal Cannula 2.0 


 


7/30/18 18:59 36.5 69 18 136/77 (96) 95 Nasal Cannula 2.0 


 


7/30/18 18:04 36.4 80 18 101/63 (76) 93 Nasal Cannula 2.0 


 


7/30/18 18:00      Nasal Cannula 2.0 


 


7/30/18 17:32 36.5 75 16 108/68 (81) 92 Nasal Cannula 2.0 


 


7/30/18 17:09      Nasal Cannula 2.0 


 


7/30/18 17:00 36.6 82 16 110/78 (89) 95 Nasal Cannula 2.0 


 


7/30/18 16:45 36.5 84 19 115/66 94 Nasal Cannula 4 


 


7/30/18 16:35  84 25 114/66 93 Nasal Cannula 4 


 


7/30/18 16:25  89 27 113/76 95 Oxymask 10 


 


7/30/18 16:15  89 27 113/76 95 Oxymask 10 


 


7/30/18 16:07 36.2 89 17 117/70 96 Oxymask 10 











Physical Exam


Notes:


General appearance:  +Obese.  Well-developed, well-nourished, no apparent 

distress


Head:  Normocephalic, atraumatic


Eyes:  Normal inspection, PERRL, EOMI


ENT:  Normal ENT inspection, hearing grossly normal, pharynx normal


Neck:  Supple, no JVD, trachea midline


Respiratory/Chest:  Lungs clear to auscultation, normal breath sounds, no 

respiratory distress


Cardiovascular:  Regular rate & rhythm, no gallop, no murmur


Abdomen/GI:  +Firm.  Normal bowel sounds, non-tender


Extremities/Musculoskeletal:  Normal inspection, no calf tenderness, no pedal 

edema


Neurological/Psych:  Alert, normal mood/affect, oriented x 3


Skin:  Normal color, warm/dry, no rash





Laboratory Results





Last 24 Hours








Test


  7/30/18


16:11 7/30/18


16:18 7/30/18


18:22 7/30/18


20:50


 


Bedside Glucose 215 mg/dl   259 mg/dl  260 mg/dl 


 


Hemoglobin  11.6 g/dL   


 


Hematocrit  33.9 %   


 


Estimated Average Glucose  148 mg/dl   


 


Hemoglobin A1c  6.8 %   


 


Test


  7/30/18


22:03 7/31/18


05:12 7/31/18


08:16 


 


 


Bedside Glucose 245 mg/dl   199 mg/dl  


 


White Blood Count  10.57 K/uL   


 


Red Blood Count  2.99 M/uL   


 


Hemoglobin  9.8 g/dL   


 


Hematocrit  28.8 %   


 


Mean Corpuscular Volume  96.3 fL   


 


Mean Corpuscular Hemoglobin  32.8 pg   


 


Mean Corpuscular Hemoglobin


Concent 


  34.0 g/dl 


  


  


 


 


Platelet Count  144 K/uL   


 


Mean Platelet Volume  10.2 fL   


 


Neutrophils (%) (Auto)  88.9 %   


 


Lymphocytes (%) (Auto)  6.1 %   


 


Monocytes (%) (Auto)  4.4 %   


 


Eosinophils (%) (Auto)  0.0 %   


 


Basophils (%) (Auto)  0.0 %   


 


Neutrophils # (Auto)  9.40 K/uL   


 


Lymphocytes # (Auto)  0.64 K/uL   


 


Monocytes # (Auto)  0.47 K/uL   


 


Eosinophils # (Auto)  0.00 K/uL   


 


Basophils # (Auto)  0.00 K/uL   


 


RDW Standard Deviation  49.3 fL   


 


RDW Coefficient of Variation  14.0 %   


 


Immature Granulocyte % (Auto)  0.6 %   


 


Immature Granulocyte # (Auto)  0.06 K/uL   


 


Sodium Level  135 mmol/L   


 


Potassium Level  4.2 mmol/L   


 


Chloride Level  102 mmol/L   


 


Carbon Dioxide Level  22 mmol/L   


 


Anion Gap  11.0 mmol/L   


 


Blood Urea Nitrogen  20 mg/dl   


 


Creatinine  1.32 mg/dl   


 


Est Creatinine Clear Calc


Drug Dose 


  64.4 ml/min 


  


  


 


 


Estimated GFR (


American) 


  64.2 


  


  


 


 


Estimated GFR (Non-


American 


  55.4 


  


  


 


 


BUN/Creatinine Ratio  15.5   


 


Random Glucose  178 mg/dl   


 


Calcium Level  7.3 mg/dl   











Diagnostic Results


Reviewed EKG and agree with interpretation as follows:





105 bpm, sinus tachycardia





Assessment and Plan


69 y/o male with a history of a-fib, HTN, DM II, and BPH who presents s/p 

lumbar decompression and fusion with Dr. Smith on 7/30 for medical management.





S/p lumbar decompression and fusion--POD #1


-Pain management, DVT prophylaxis, and PT/OT as per primary team


-Dizzy/lightheaded while working with PT this morning, resolved w/rest.  

Attempted getting up/walking again later in afternoon and this time no symptoms


-Miralax regimen started today per ortho





Postop urinary retention


-Paged in afternoon by nursing, pt not voiding.  Bladder scan 436 cc.  Pt felt 

like he would void but one hour later only voided 150 cc.  Repeat bladder scan 

still over 400 cc


-Straight cath now, then prn bladder scan over 400 cc





Atrial fibrillation--stable, currently in SR


-Per nursing, pt had been in rapid a-fib while feeling dizzy w/PT this am.  

This is now resolved after rest


-EKG shows sinus tachycardia with , HR slowing down during my exam


-Continue diltiazem 240 mg PO qd, Lopressor 50 mg PO BID


-Resume anticoagulation Xarelto when ok with surgery





HTN--stable


-Continue diltiazem, Lopressor as above


-HCTZ/valsartan on hold


-Reconciled meds w/pharmacy and pt.  Lasix was stopped a few weeks ago and 

valsartan/HCTZ started instead.  Outpt meds updated





DM II--stable


-BSGs in mid 200s yesterday, improving today


-Hold Janumet


-Insulin sliding scale


-Check BSGs q ac and qhs


-HgbA1c 6.8 on 7/30





CKD stage III--stable


-Creatinine appears to be stable compared to preop labs





BPH


-Continue alfuzosin 10 mg PO qd





Code Status


-Level I, FULL RESUSCITATION STATUS





Thank you for this consultation.  We will continue to follow.

## 2018-07-31 NOTE — ANESTHESIOLOGY PROGRESS NOTE
Anesthesia Post Op Note


Date & Time


Jul 31, 2018 at 10:28





Vital Signs





Vital Signs Past 12 Hours








  Date Time  Temp Pulse Resp B/P (MAP) Pulse Ox O2 Delivery O2 Flow Rate FiO2


 


7/31/18 07:24      Room Air  


 


7/31/18 07:09 36.6 100 17 118/72 (87) 94 Room Air  


 


7/31/18 06:07     91 Room Air  


 


7/31/18 03:00 36.4 90 18 98/61 (73) 95 Room Air  


 


7/31/18 00:05      Room Air  


 


7/30/18 22:50 36.5 84 16 103/65 (78) 95 Nasal Cannula 2.0 











Notes


Mental Status:  alert / awake / arousable, participated in evaluation


Pt Amnestic to Procedure:  Yes


Nausea / Vomiting:  adequately controlled


Pain:  adequately controlled


Airway Patency, RR, SpO2:  stable & adequate


BP & HR:  stable & adequate


Hydration State:  stable & adequate


Anesthetic Complications:  no major complications apparent

## 2018-08-01 VITALS
HEART RATE: 68 BPM | DIASTOLIC BLOOD PRESSURE: 73 MMHG | OXYGEN SATURATION: 94 % | TEMPERATURE: 97.88 F | SYSTOLIC BLOOD PRESSURE: 114 MMHG

## 2018-08-01 VITALS — SYSTOLIC BLOOD PRESSURE: 122 MMHG | HEART RATE: 62 BPM | DIASTOLIC BLOOD PRESSURE: 70 MMHG | OXYGEN SATURATION: 95 %

## 2018-08-01 VITALS — SYSTOLIC BLOOD PRESSURE: 115 MMHG | HEART RATE: 62 BPM | DIASTOLIC BLOOD PRESSURE: 71 MMHG

## 2018-08-01 VITALS
DIASTOLIC BLOOD PRESSURE: 71 MMHG | HEART RATE: 80 BPM | SYSTOLIC BLOOD PRESSURE: 110 MMHG | TEMPERATURE: 98.06 F | OXYGEN SATURATION: 95 %

## 2018-08-01 VITALS
OXYGEN SATURATION: 93 % | HEART RATE: 80 BPM | TEMPERATURE: 98.24 F | SYSTOLIC BLOOD PRESSURE: 111 MMHG | DIASTOLIC BLOOD PRESSURE: 72 MMHG

## 2018-08-01 VITALS — DIASTOLIC BLOOD PRESSURE: 74 MMHG | SYSTOLIC BLOOD PRESSURE: 118 MMHG | HEART RATE: 70 BPM | OXYGEN SATURATION: 96 %

## 2018-08-01 LAB
BUN SERPL-MCNC: 22 MG/DL (ref 7–18)
CALCIUM SERPL-MCNC: 7.8 MG/DL (ref 8.5–10.1)
CO2 SERPL-SCNC: 27 MMOL/L (ref 21–32)
CREAT SERPL-MCNC: 1.24 MG/DL (ref 0.6–1.4)
EOSINOPHIL NFR BLD AUTO: 126 K/UL (ref 130–400)
GLUCOSE SERPL-MCNC: 190 MG/DL (ref 70–99)
HCT VFR BLD CALC: 27.8 % (ref 42–52)
HGB BLD-MCNC: 9.6 G/DL (ref 14–18)
MCH RBC QN AUTO: 32.9 PG (ref 25–34)
MCHC RBC AUTO-ENTMCNC: 34.5 G/DL (ref 32–36)
MCV RBC AUTO: 95.2 FL (ref 80–100)
PMV BLD AUTO: 9.7 FL (ref 7.4–10.4)
POTASSIUM SERPL-SCNC: 4.4 MMOL/L (ref 3.5–5.1)
RED CELL DISTRIBUTION WIDTH CV: 14.2 % (ref 11.5–14.5)
RED CELL DISTRIBUTION WIDTH SD: 49.2 FL (ref 36.4–46.3)
SODIUM SERPL-SCNC: 133 MMOL/L (ref 136–145)
WBC # BLD AUTO: 8.01 K/UL (ref 4.8–10.8)

## 2018-08-01 RX ADMIN — OXYCODONE HYDROCHLORIDE PRN MG: 5 TABLET ORAL at 19:09

## 2018-08-01 RX ADMIN — Medication SCH GM: at 23:20

## 2018-08-01 RX ADMIN — PANTOPRAZOLE SCH MG: 40 TABLET, DELAYED RELEASE ORAL at 21:05

## 2018-08-01 RX ADMIN — PANTOPRAZOLE SCH MG: 40 TABLET, DELAYED RELEASE ORAL at 08:22

## 2018-08-01 RX ADMIN — METOPROLOL TARTRATE SCH MG: 50 TABLET, FILM COATED ORAL at 21:05

## 2018-08-01 RX ADMIN — OXYCODONE HYDROCHLORIDE PRN MG: 5 TABLET ORAL at 14:58

## 2018-08-01 RX ADMIN — ALFUZOSIN HYDROCHLORIDE SCH MG: 10 TABLET, EXTENDED RELEASE ORAL at 08:24

## 2018-08-01 RX ADMIN — STANDARDIZED SENNA CONCENTRATE AND DOCUSATE SODIUM SCH TAB: 8.6; 5 TABLET ORAL at 21:05

## 2018-08-01 RX ADMIN — ACETAMINOPHEN PRN MG: 500 TABLET, FILM COATED ORAL at 00:45

## 2018-08-01 RX ADMIN — DILTIAZEM HYDROCHLORIDE SCH MG: 120 CAPSULE, EXTENDED RELEASE ORAL at 08:24

## 2018-08-01 RX ADMIN — INSULIN ASPART SCH UNITS: 100 INJECTION, SOLUTION INTRAVENOUS; SUBCUTANEOUS at 21:07

## 2018-08-01 RX ADMIN — OXYCODONE HYDROCHLORIDE PRN MG: 5 TABLET ORAL at 23:19

## 2018-08-01 RX ADMIN — INSULIN ASPART SCH UNITS: 100 INJECTION, SOLUTION INTRAVENOUS; SUBCUTANEOUS at 08:34

## 2018-08-01 RX ADMIN — INSULIN ASPART SCH UNITS: 100 INJECTION, SOLUTION INTRAVENOUS; SUBCUTANEOUS at 18:00

## 2018-08-01 RX ADMIN — Medication SCH GM: at 17:54

## 2018-08-01 RX ADMIN — Medication SCH GM: at 12:09

## 2018-08-01 RX ADMIN — Medication SCH GM: at 06:03

## 2018-08-01 RX ADMIN — METOPROLOL TARTRATE SCH MG: 50 TABLET, FILM COATED ORAL at 08:24

## 2018-08-01 RX ADMIN — INSULIN ASPART SCH UNITS: 100 INJECTION, SOLUTION INTRAVENOUS; SUBCUTANEOUS at 12:30

## 2018-08-01 NOTE — HOSPITALIST PROGRESS NOTE
Hospitalist Progress Note


Date of Service


Aug 1, 2018.





Subjective


Pt evaluation today including:  conversation w/ patient, physical exam, chart 

review, lab review, review of inpatient medication list


Pain:  Pain controlled


PO Intake:  Tolerating PO diet


Voiding:  no voiding problems


The patient reports feeling well.  He states his pain is well controlled.  He 

does report some residual numbness/tingling in his right hip.  He is tolerating 

a PO diet well.  He is now voiding on his own without issue with good urinary 

output.  He has not yet passed gas or had a bowel movement postop but feels 

like he is close.  He denies any further dizziness, lightheadedness, or 

palpitations and states he was up this morning and walked 4 laps around the 

hallway without issue.  The patient denies fevers, chills, sweats, chest pain, 

palpitations, claudication, cough, wheezing, shortness of breath, nausea, 

vomiting, abdominal pain, dysuria, hematuria, urinary retention, paralysis, 

weakness.





   Additional Comments:


See HPI for pertinent positives and negatives.  All other systems reviewed and 

negative.





Objective


Vital Signs











  Date Time  Temp Pulse Resp B/P (MAP) Pulse Ox O2 Delivery O2 Flow Rate FiO2


 


8/1/18 08:00      Room Air  


 


8/1/18 06:57 36.7 80 18 110/71 (84) 95 Room Air  


 


8/1/18 00:35      Room Air  


 


7/31/18 23:58 36.6 84 16 123/75 (91) 93 Room Air  


 


7/31/18 20:56  60  118/77 (91)    


 


7/31/18 16:00      Room Air  


 


7/31/18 14:59 36.7 88 18 116/75 (89) 95 Room Air  


 


7/31/18 11:23 36.6 98 17 114/72 (86) 95 Room Air  











Physical Exam


Notes:


General appearance:  +Obese.  Well-developed, well-nourished, no apparent 

distress


Head:  Normocephalic, atraumatic


Eyes:  Normal inspection, PERRL, EOMI


ENT:  Normal ENT inspection, hearing grossly normal, pharynx normal


Neck:  Supple, no JVD, trachea midline


Respiratory/Chest:  Lungs clear to auscultation, normal breath sounds, no 

respiratory distress


Cardiovascular:  Regular rate & rhythm, no gallop, no murmur


Abdomen/GI:  +Firm.  Normal bowel sounds, non-tender


Extremities/Musculoskeletal:  +1-2+ pitting edema.  Normal inspection, no calf 

tenderness


Neurological/Psych:  Alert, normal mood/affect, oriented x 3


Skin:  Normal color, warm/dry, no rash





Laboratory Results





Last 24 Hours








Test


  7/31/18


11:56 7/31/18


17:24 7/31/18


20:27 8/1/18


05:29


 


Bedside Glucose 193 mg/dl  194 mg/dl  188 mg/dl  


 


White Blood Count    8.01 K/uL 


 


Red Blood Count    2.92 M/uL 


 


Hemoglobin    9.6 g/dL 


 


Hematocrit    27.8 % 


 


Mean Corpuscular Volume    95.2 fL 


 


Mean Corpuscular Hemoglobin    32.9 pg 


 


Mean Corpuscular Hemoglobin


Concent 


  


  


  34.5 g/dl 


 


 


RDW Standard Deviation    49.2 fL 


 


RDW Coefficient of Variation    14.2 % 


 


Platelet Count    126 K/uL 


 


Mean Platelet Volume    9.7 fL 


 


Sodium Level    133 mmol/L 


 


Potassium Level    4.4 mmol/L 


 


Chloride Level    99 mmol/L 


 


Carbon Dioxide Level    27 mmol/L 


 


Anion Gap    8.0 mmol/L 


 


Blood Urea Nitrogen    22 mg/dl 


 


Creatinine    1.24 mg/dl 


 


Est Creatinine Clear Calc


Drug Dose 


  


  


  68.5 ml/min 


 


 


Estimated GFR (


American) 


  


  


  69.3 


 


 


Estimated GFR (Non-


American 


  


  


  59.8 


 


 


BUN/Creatinine Ratio    17.9 


 


Random Glucose    190 mg/dl 


 


Calcium Level    7.8 mg/dl 


 


Test


  8/1/18


07:58 


  


  


 


 


Bedside Glucose 244 mg/dl    











Assessment and Plan


67 y/o male with a history of a-fib, HTN, DM II, and BPH who presents s/p 

lumbar decompression and fusion with Dr. Smith on 7/30 for medical management.





S/p lumbar decompression and fusion--POD #2


-Pain management, DVT prophylaxis, and PT/OT as per primary team


-Dizziness/palpitations resolved.  Orthostatics negative


-Miralax q6h until BM per ortho





Postop urinary retention--resolved


-Straight cathed yesterday evening, voiding on his own since then w/good 

urinary output





Atrial fibrillation--stable, currently in SR


-EKG shows sinus tachycardia with 


-Continue diltiazem 240 mg PO qd, Lopressor 50 mg PO BID


-Resume anticoagulation Xarelto when ok with surgery





HTN--stable


-Continue diltiazem, Lopressor as above


-HCTZ/valsartan on hold


-Reconciled meds w/pharmacy and pt.  Lasix was stopped a few weeks ago and 

valsartan/HCTZ started instead.  Outpt meds updated


-Some batches of valsartan recalled due to carcinogen contamination.  Unsure if 

pt's valsartan part of affected batches or not although he denies receiving any 

notification of this.  


-Could consider switching valsartan/HCTZ to losartan/HCTZ 100/25 mg.  BP well 

controlled w/o antihypertensives, will hold off restarting for now, can resume 

at discharge





DM II--stable


-Hold Janumet


-Insulin sliding scale


-Check BSGs q ac and qhs


-HgbA1c 6.8 on 7/30





CKD stage III--stable


-Creatinine appears to be stable compared to preop labs





BPH


-Continue alfuzosin 10 mg PO qd





Code Status


-Level I, FULL RESUSCITATION STATUS





Pt. is stable from a medical standpoint, we will sign off.  Clear for discharge 

as per primary team.

## 2018-08-01 NOTE — PROGRESS NOTE
Progress Note


Date of Service


Aug 1, 2018.





Progress Note


Patient's back pain is controlled leg symptoms are improved.  Vital signs are 

stable.  JULY drain decreasing appropriately.  On exam he is in the chair at the 

bedside is good strength testing appears comfortable.  Assessment status post 

lumbar decompression fusion.  Plan at this time will continue physical therapy 

monitor his JULY output anticipate discharge home tomorrow possibly was home with 

home health.

## 2018-08-02 VITALS
DIASTOLIC BLOOD PRESSURE: 75 MMHG | SYSTOLIC BLOOD PRESSURE: 119 MMHG | TEMPERATURE: 98.06 F | OXYGEN SATURATION: 92 % | HEART RATE: 81 BPM

## 2018-08-02 VITALS
TEMPERATURE: 98.06 F | OXYGEN SATURATION: 92 % | DIASTOLIC BLOOD PRESSURE: 75 MMHG | SYSTOLIC BLOOD PRESSURE: 119 MMHG | HEART RATE: 81 BPM

## 2018-08-02 LAB
BUN SERPL-MCNC: 22 MG/DL (ref 7–18)
CALCIUM SERPL-MCNC: 8.1 MG/DL (ref 8.5–10.1)
CO2 SERPL-SCNC: 27 MMOL/L (ref 21–32)
CREAT SERPL-MCNC: 1.27 MG/DL (ref 0.6–1.4)
EOSINOPHIL NFR BLD AUTO: 133 K/UL (ref 130–400)
GLUCOSE SERPL-MCNC: 193 MG/DL (ref 70–99)
HCT VFR BLD CALC: 27.2 % (ref 42–52)
HGB BLD-MCNC: 9.3 G/DL (ref 14–18)
MCH RBC QN AUTO: 32.9 PG (ref 25–34)
MCHC RBC AUTO-ENTMCNC: 34.2 G/DL (ref 32–36)
MCV RBC AUTO: 96.1 FL (ref 80–100)
NRBC BLD AUTO-RTO: 0.5 %
NUCLEATED RED BLOOD CELL ABS: 0.03 K/UL (ref 0–0)
PMV BLD AUTO: 10 FL (ref 7.4–10.4)
POTASSIUM SERPL-SCNC: 4.4 MMOL/L (ref 3.5–5.1)
RED CELL DISTRIBUTION WIDTH CV: 14.2 % (ref 11.5–14.5)
RED CELL DISTRIBUTION WIDTH SD: 49.5 FL (ref 36.4–46.3)
SODIUM SERPL-SCNC: 133 MMOL/L (ref 136–145)
WBC # BLD AUTO: 6.88 K/UL (ref 4.8–10.8)

## 2018-08-02 RX ADMIN — DILTIAZEM HYDROCHLORIDE SCH MG: 120 CAPSULE, EXTENDED RELEASE ORAL at 08:11

## 2018-08-02 RX ADMIN — OXYCODONE HYDROCHLORIDE PRN MG: 5 TABLET ORAL at 08:49

## 2018-08-02 RX ADMIN — OXYCODONE HYDROCHLORIDE PRN MG: 5 TABLET ORAL at 04:18

## 2018-08-02 RX ADMIN — METOPROLOL TARTRATE SCH MG: 50 TABLET, FILM COATED ORAL at 08:11

## 2018-08-02 RX ADMIN — INSULIN ASPART SCH UNITS: 100 INJECTION, SOLUTION INTRAVENOUS; SUBCUTANEOUS at 08:14

## 2018-08-02 RX ADMIN — PANTOPRAZOLE SCH MG: 40 TABLET, DELAYED RELEASE ORAL at 08:10

## 2018-08-02 RX ADMIN — ALFUZOSIN HYDROCHLORIDE SCH MG: 10 TABLET, EXTENDED RELEASE ORAL at 08:11

## 2018-08-02 RX ADMIN — Medication SCH GM: at 05:30

## 2018-08-02 NOTE — DISCHARGE SUMMARY
Orthopedic Discharge Summary


Admission Date/Reason


Jul 30, 2018 at 12:00


Lumbar Spinal Stenosis.





Discharge Date/Disposition


Aug 2, 2018


Home





Diagnosis


Principal Diagnosis:


Lumbar spinal stenosis





Admission Physical Exam


As per Admitting History & Physical.





Hospital Course


Patient underwent multilevel lumbar decompression fusion tolerated this well 

went to the orthopedic for postoperative.  Postop day #1 he was up and amatory 

progressive postop day #2 postop day #3 pain is controlled leg symptoms 

improved subsequently discharged home.  Discharge orders and instructions found 

on the chart for further review.





Discharge Instructions


Please refer to the electronic Patient Visit Report (Discharge Instructions) 

for additional information.